# Patient Record
Sex: MALE | Race: WHITE | NOT HISPANIC OR LATINO | Employment: OTHER | URBAN - METROPOLITAN AREA
[De-identification: names, ages, dates, MRNs, and addresses within clinical notes are randomized per-mention and may not be internally consistent; named-entity substitution may affect disease eponyms.]

---

## 2022-11-12 ENCOUNTER — HOSPITAL ENCOUNTER (EMERGENCY)
Facility: HOSPITAL | Age: 82
Discharge: HOME | End: 2022-11-13
Attending: EMERGENCY MEDICINE
Payer: OTHER GOVERNMENT

## 2022-11-12 VITALS
BODY MASS INDEX: 24.34 KG/M2 | HEART RATE: 54 BPM | RESPIRATION RATE: 18 BRPM | SYSTOLIC BLOOD PRESSURE: 152 MMHG | OXYGEN SATURATION: 98 % | DIASTOLIC BLOOD PRESSURE: 72 MMHG | TEMPERATURE: 98 F | WEIGHT: 170 LBS | HEIGHT: 70 IN

## 2022-11-12 DIAGNOSIS — I10 HYPERTENSION, UNSPECIFIED TYPE: Primary | ICD-10-CM

## 2022-11-12 RX ORDER — HYDRALAZINE HYDROCHLORIDE 25 MG/1
25 TABLET, FILM COATED ORAL
Qty: 90 TABLET | Refills: 11 | Status: SHIPPED | OUTPATIENT
Start: 2022-11-12 | End: 2022-11-13 | Stop reason: SDUPTHER

## 2022-11-13 PROCEDURE — 99283 EMERGENCY DEPT VISIT LOW MDM: CPT

## 2022-11-13 RX ORDER — HYDRALAZINE HYDROCHLORIDE 25 MG/1
25 TABLET, FILM COATED ORAL
Qty: 21 TABLET | Refills: 0 | Status: SHIPPED | OUTPATIENT
Start: 2022-11-13 | End: 2023-11-13

## 2022-11-13 NOTE — ED NOTES
Pt reports elevated bp since 1800, highest of 202/88 pta. Denies cp, sob or change of neurological symptoms.     Aaox4. Resp e/u. VSS. nad

## 2022-11-13 NOTE — DISCHARGE INSTRUCTIONS
Continue your blood pressure medications as prescribed.  If after taking your medications your blood pressure is above 160 for the top number, or above 100 for the bottom number, take hydralazine.  You can take it up to 3 times daily.  Follow-[facility] as soon as possible with [facility] and return here as needed or if worse in any way.

## 2022-11-13 NOTE — ED PROVIDER NOTES
Encounter Date: 11/12/2022       History     Chief Complaint   Patient presents with    Hypertension     hypertension     81-year-old male here for evaluation of elevated blood pressure.  He states that at home, he took his blood pressure and found the systolic pressure to be slightly over 200, with the bottom number slightly above 100.  He states he took his evening blood pressure medication little bit early, and by the time he arrived here, his blood pressure is now about 150 systolic and 70 diastolic.  He denies any symptoms.  He does not know wise blood pressure became elevated.  He states it may be caused by fatigue.  He states he got up very early this morning and went fishing for several hours today.      Review of patient's allergies indicates:  No Known Allergies  Past Medical History:   Diagnosis Date    Coronary artery disease      Past Surgical History:   Procedure Laterality Date    CARDIAC SURGERY       History reviewed. No pertinent family history.  Social History     Tobacco Use    Smoking status: Former     Types: Cigarettes    Smokeless tobacco: Never   Substance Use Topics    Alcohol use: Not Currently    Drug use: Never     Review of Systems   Constitutional: Negative.    HENT: Negative.     Eyes: Negative.    Respiratory: Negative.     Cardiovascular: Negative.    Gastrointestinal: Negative.    Endocrine: Negative.    Genitourinary: Negative.    Musculoskeletal: Negative.    Skin: Negative.    Allergic/Immunologic: Negative.    Neurological: Negative.    Hematological: Negative.    Psychiatric/Behavioral: Negative.       Physical Exam     Initial Vitals [11/12/22 2149]   BP Pulse Resp Temp SpO2   (!) 167/88 60 18 98.2 °F (36.8 °C) 98 %      MAP       --         Physical Exam    Nursing note and vitals reviewed.  Constitutional: He appears well-developed and well-nourished. He is not diaphoretic. No distress.   HENT:   Head: Normocephalic and atraumatic.   Nose: Nose normal.   Mouth/Throat:  Oropharynx is clear and moist. No oropharyngeal exudate.   Eyes: Conjunctivae and EOM are normal. Pupils are equal, round, and reactive to light. No scleral icterus.   Neck: Neck supple. No JVD present.   Normal range of motion.  Cardiovascular:  Normal rate, regular rhythm, normal heart sounds and intact distal pulses.           No murmur heard.  Pulmonary/Chest: Breath sounds normal. No stridor. No respiratory distress.   Abdominal: Abdomen is soft. Bowel sounds are normal. He exhibits no distension. There is no abdominal tenderness.   Genitourinary:    Prostate and penis normal.     Musculoskeletal:         General: No tenderness or edema. Normal range of motion.      Cervical back: Normal range of motion and neck supple.     Neurological: He is alert and oriented to person, place, and time. He has normal strength and normal reflexes. No cranial nerve deficit or sensory deficit. GCS score is 15. GCS eye subscore is 4. GCS verbal subscore is 5. GCS motor subscore is 6.   Skin: Skin is warm and dry. Capillary refill takes less than 2 seconds. No rash noted. No erythema.   Psychiatric: He has a normal mood and affect. His behavior is normal.       ED Course   Procedures  Labs Reviewed - No data to display       Imaging Results    None          Medications - No data to display  Medical Decision Making:   Differential Diagnosis:   Hypertension, medication noncompliance, fatigue, dietary noncompliance, etc.  ED Management:  Evidently patient's blood pressures were elevated prior to coming here, but while here, they are acceptable.  He states he took his evening medications little bit early because of the elevation.  He denies any complaints whatsoever.  I believe he is safe for discharge.  I have prescribed hydralazine to be taken p.r.n. with parameters.  [facility], but will return here for any problems or concerns.                        Clinical Impression:   Final diagnoses:  [I10]  Hypertension, unspecified type (Primary)        ED Disposition Condition    Discharge Stable          ED Prescriptions       Medication Sig Dispense Start Date End Date Auth. Provider    hydrALAZINE (APRESOLINE) 25 MG tablet  (Status: Discontinued) Take 1 tablet (25 mg total) by mouth after meals as needed. For systolic blood pressure greater than 160 or diastolic blood pressure greater than 100. 90 tablet 11/12/2022 11/13/2022 Krishna Sahni MD    hydrALAZINE (APRESOLINE) 25 MG tablet Take 1 tablet (25 mg total) by mouth after meals as needed. For systolic blood pressure greater than 160 or diastolic blood pressure greater than 100. 21 tablet 11/13/2022 11/13/2023 Krishna Sahni MD          Follow-up Information       Follow up With Specialties Details Why Contact Info    [facility]  Schedule an appointment as soon as possible for a visit       [facility] Emergency Medicine  As needed, If symptoms worsen [address] [facility] [id number]  [phone]             Krishna Sahni MD  11/13/22 0005

## 2022-11-13 NOTE — ED NOTES
D/c to home. Rx provided with instructions for use. V/S stable. Normotensive. Follow up explained.

## 2024-03-16 ENCOUNTER — HOSPITAL ENCOUNTER (EMERGENCY)
Facility: HOSPITAL | Age: 84
Discharge: HOME | End: 2024-03-16
Payer: OTHER GOVERNMENT

## 2024-03-16 VITALS
SYSTOLIC BLOOD PRESSURE: 173 MMHG | HEART RATE: 79 BPM | RESPIRATION RATE: 20 BRPM | HEIGHT: 70 IN | TEMPERATURE: 98 F | OXYGEN SATURATION: 98 % | WEIGHT: 162 LBS | DIASTOLIC BLOOD PRESSURE: 89 MMHG | BODY MASS INDEX: 23.19 KG/M2

## 2024-03-16 DIAGNOSIS — S63.501A SPRAIN OF RIGHT WRIST, INITIAL ENCOUNTER: Primary | ICD-10-CM

## 2024-03-16 DIAGNOSIS — M25.531 RIGHT WRIST PAIN: ICD-10-CM

## 2024-03-16 PROCEDURE — 63600175 PHARM REV CODE 636 W HCPCS: Performed by: PHYSICIAN ASSISTANT

## 2024-03-16 PROCEDURE — 29125 APPL SHORT ARM SPLINT STATIC: CPT | Mod: RT

## 2024-03-16 PROCEDURE — 73110 X-RAY EXAM OF WRIST: CPT | Mod: 26,RT,, | Performed by: RADIOLOGY

## 2024-03-16 PROCEDURE — 73110 X-RAY EXAM OF WRIST: CPT | Mod: TC,RT

## 2024-03-16 PROCEDURE — 99284 EMERGENCY DEPT VISIT MOD MDM: CPT | Mod: 25

## 2024-03-16 PROCEDURE — 96372 THER/PROPH/DIAG INJ SC/IM: CPT | Performed by: PHYSICIAN ASSISTANT

## 2024-03-16 RX ORDER — ALBUTEROL SULFATE 90 UG/1
AEROSOL, METERED RESPIRATORY (INHALATION)
COMMUNITY
Start: 2023-05-10

## 2024-03-16 RX ORDER — ISOSORBIDE MONONITRATE 60 MG/1
60 TABLET, EXTENDED RELEASE ORAL
COMMUNITY
Start: 2023-09-14

## 2024-03-16 RX ORDER — ROSUVASTATIN CALCIUM 10 MG/1
1 TABLET, COATED ORAL DAILY
COMMUNITY
Start: 2023-12-11

## 2024-03-16 RX ORDER — FENOFIBRATE 145 MG/1
145 TABLET, FILM COATED ORAL
COMMUNITY
Start: 2023-09-14

## 2024-03-16 RX ORDER — FOLIC ACID 1 MG/1
1 TABLET ORAL
COMMUNITY
Start: 2023-10-30

## 2024-03-16 RX ORDER — LOSARTAN POTASSIUM 100 MG/1
50 TABLET ORAL
COMMUNITY
Start: 2024-02-14

## 2024-03-16 RX ORDER — CARVEDILOL 3.12 MG/1
TABLET ORAL
COMMUNITY
Start: 2024-02-14

## 2024-03-16 RX ORDER — LANOLIN ALCOHOL/MO/W.PET/CERES
1000 CREAM (GRAM) TOPICAL
COMMUNITY
Start: 2023-12-11

## 2024-03-16 RX ORDER — CLOPIDOGREL BISULFATE 75 MG/1
75 TABLET ORAL
COMMUNITY
Start: 2023-09-19

## 2024-03-16 RX ORDER — DEXAMETHASONE SODIUM PHOSPHATE 10 MG/ML
10 INJECTION INTRAMUSCULAR; INTRAVENOUS
Status: COMPLETED | OUTPATIENT
Start: 2024-03-16 | End: 2024-03-16

## 2024-03-16 RX ADMIN — DEXAMETHASONE SODIUM PHOSPHATE 10 MG: 10 INJECTION INTRAMUSCULAR; INTRAVENOUS at 07:03

## 2024-03-17 NOTE — DISCHARGE INSTRUCTIONS
Take Tylenol as needed for pain.  Wear the splint until evaluated by Orthopedic.  Return for any worsening or new symptoms. Follow up with Orthopedic in the next 5-7 days.

## 2024-03-17 NOTE — ED PROVIDER NOTES
"  Please note that my documentation in this Electronic Healthcare Record was produced using speech recognition software and therefore may contain errors related to that software.These could include grammar, punctuation and spelling errors or the inclusion/ exclusion of phrases that were not intended. Please contact myself for any clarification, questions or concerns.    HPI: Patient is a 83 y.o. male who presents with the chief complaint of right wrist pain X today.  Patient was at the [employer] pure[employer] when he caught a big head of cabbage.  Denies numbness or tingling.  He has taken some Tylenol.  Denies any actual fall.    REVIEW OF SYSTEMS - 10 systems were independently reviewed and are otherwise negative with the exception of those items previously documented in the HPI and nursing notes.    Allergy: Clindamycin, Erythromycin, Iodinated contrast media, and Niacin    Past medical history:   Past Medical History:   Diagnosis Date    Coronary artery disease        Surgical History:   Past Surgical History:   Procedure Laterality Date    CARDIAC SURGERY         Social history:   Social History     Socioeconomic History    Marital status:    Tobacco Use    Smoking status: Former     Types: Cigarettes    Smokeless tobacco: Never   Substance and Sexual Activity    Alcohol use: Not Currently    Drug use: Never    Sexual activity: Not Currently       Family history: non-contributory    EHR: reviewed    Vitals: BP (!) 173/89   Pulse 79   Temp 98 °F (36.7 °C) (Oral)   Resp 20   Ht 5' 10" (1.778 m)   Wt 73.5 kg (162 lb)   SpO2 98%   BMI 23.24 kg/m²     PHYSICAL EXAM:    General- 83-year-old male awake and alert, oriented, GCS 15, in no acute distress,  HEENT- normocephalic, atraumatic, sclera anicteric  CARDIOVASCULAR- regular rate and rhythm  PULMONARY- nonlabored, no respiratory distress  NEUROLOGIC- mental status normal, speech fluid, cognition normal, CN II-XII grossly intact, ambulatory with proper " gait.  MUSCULOSKELETAL- well-nourished, well-developed, neuro intact, tenderness over the right lateral wrist with tenderness over the scaphoid and the CMC joint.  Full range of motion of the right upper extremity.  DERMATOLOGIC- warm and dry, no visible rashes  PSYCHIATRIC- normal affect, normal concentration           Labs Reviewed - No data to display    X-Ray Wrist Complete Right   Final Result      As above.         Electronically signed by: Carmita Gaffney   Date:    03/16/2024   Time:    19:02          MEDICAL DECISION MAKING: Patient is a 83 y.o. male who presented with chief complaint of right wrist pain after catching cabbage today.  Denies fall or significant trauma.  Denies numbness or tingling.  On examination, he is neuro intact with good peripheral pulse, sensation, and cap refill.  Does have tenderness over the 1st CMC joints and over the right lateral wrist including the scaphoid.  Differentials considered, fracture dislocation, sprain/strain, contusion, etc..  X-ray of the wrist reviewed by myself radiology shows significant arthritic changes over the carpometacarpal joint.  Nothing acutely abnormal.  I have placed the patient in a thumb spica splint until he is evaluated [facility]. advised on Tylenol.  Patient's vitals are stable and normal.  They will be discharged home in stable condition.  They verbalized their understanding and agreed to this plan.    CLINICAL IMPRESSION:  1. Sprain of right wrist, initial encounter    2. Right wrist pain         Alexandra Levy PA  03/16/24 1928

## 2024-09-17 ENCOUNTER — HOSPITAL ENCOUNTER (EMERGENCY)
Facility: HOSPITAL | Age: 84
Discharge: HOME | End: 2024-09-17
Payer: OTHER GOVERNMENT

## 2024-09-17 VITALS
RESPIRATION RATE: 18 BRPM | WEIGHT: 162 LBS | SYSTOLIC BLOOD PRESSURE: 149 MMHG | HEART RATE: 71 BPM | DIASTOLIC BLOOD PRESSURE: 76 MMHG | TEMPERATURE: 98 F | HEIGHT: 70 IN | OXYGEN SATURATION: 98 % | BODY MASS INDEX: 23.19 KG/M2

## 2024-09-17 DIAGNOSIS — I10 HYPERTENSION: ICD-10-CM

## 2024-09-17 DIAGNOSIS — I10 UNCONTROLLED HYPERTENSION: ICD-10-CM

## 2024-09-17 DIAGNOSIS — R07.9 CHEST PAIN: ICD-10-CM

## 2024-09-17 LAB
ALBUMIN SERPL BCP-MCNC: 3.8 G/DL (ref 3.5–5.2)
ALP SERPL-CCNC: 38 U/L (ref 55–135)
ALT SERPL W/O P-5'-P-CCNC: 10 U/L (ref 10–44)
ANION GAP SERPL CALC-SCNC: 9 MMOL/L (ref 8–16)
AST SERPL-CCNC: 16 U/L (ref 10–40)
BASOPHILS # BLD AUTO: 0.08 K/UL (ref 0–0.2)
BASOPHILS NFR BLD: 1.7 % (ref 0–1.9)
BILIRUB SERPL-MCNC: 0.4 MG/DL (ref 0.1–1)
BNP SERPL-MCNC: 105 PG/ML (ref 0–99)
BUN SERPL-MCNC: 25 MG/DL (ref 8–23)
CALCIUM SERPL-MCNC: 9.4 MG/DL (ref 8.7–10.5)
CHLORIDE SERPL-SCNC: 108 MMOL/L (ref 95–110)
CO2 SERPL-SCNC: 24 MMOL/L (ref 23–29)
CREAT SERPL-MCNC: 1.6 MG/DL (ref 0.5–1.4)
DIFFERENTIAL METHOD BLD: ABNORMAL
EOSINOPHIL # BLD AUTO: 0.4 K/UL (ref 0–0.5)
EOSINOPHIL NFR BLD: 8.3 % (ref 0–8)
ERYTHROCYTE [DISTWIDTH] IN BLOOD BY AUTOMATED COUNT: 14 % (ref 11.5–14.5)
EST. GFR  (NO RACE VARIABLE): 43 ML/MIN/1.73 M^2
GLUCOSE SERPL-MCNC: 111 MG/DL (ref 70–110)
HCT VFR BLD AUTO: 40.3 % (ref 40–54)
HGB BLD-MCNC: 13.4 G/DL (ref 14–18)
IMM GRANULOCYTES # BLD AUTO: 0.01 K/UL (ref 0–0.04)
IMM GRANULOCYTES NFR BLD AUTO: 0.2 % (ref 0–0.5)
LYMPHOCYTES # BLD AUTO: 1.2 K/UL (ref 1–4.8)
LYMPHOCYTES NFR BLD: 24.4 % (ref 18–48)
MCH RBC QN AUTO: 30.2 PG (ref 27–31)
MCHC RBC AUTO-ENTMCNC: 33.3 G/DL (ref 32–36)
MCV RBC AUTO: 91 FL (ref 82–98)
MONOCYTES # BLD AUTO: 0.6 K/UL (ref 0.3–1)
MONOCYTES NFR BLD: 11.4 % (ref 4–15)
NEUTROPHILS # BLD AUTO: 2.6 K/UL (ref 1.8–7.7)
NEUTROPHILS NFR BLD: 54 % (ref 38–73)
NRBC BLD-RTO: 0 /100 WBC
PLATELET # BLD AUTO: 177 K/UL (ref 150–450)
PMV BLD AUTO: 11.6 FL (ref 9.2–12.9)
POTASSIUM SERPL-SCNC: 4.2 MMOL/L (ref 3.5–5.1)
PROT SERPL-MCNC: 6.6 G/DL (ref 6–8.4)
RBC # BLD AUTO: 4.43 M/UL (ref 4.6–6.2)
SODIUM SERPL-SCNC: 141 MMOL/L (ref 136–145)
TROPONIN I SERPL DL<=0.01 NG/ML-MCNC: 0.01 NG/ML (ref 0–0.03)
WBC # BLD AUTO: 4.83 K/UL (ref 3.9–12.7)

## 2024-09-17 PROCEDURE — 93010 ELECTROCARDIOGRAM REPORT: CPT | Mod: ,,, | Performed by: INTERNAL MEDICINE

## 2024-09-17 PROCEDURE — 85025 COMPLETE CBC W/AUTO DIFF WBC: CPT | Performed by: NURSE PRACTITIONER

## 2024-09-17 PROCEDURE — 71045 X-RAY EXAM CHEST 1 VIEW: CPT | Mod: 26,,, | Performed by: RADIOLOGY

## 2024-09-17 PROCEDURE — 93005 ELECTROCARDIOGRAM TRACING: CPT

## 2024-09-17 PROCEDURE — 86803 HEPATITIS C AB TEST: CPT | Performed by: EMERGENCY MEDICINE

## 2024-09-17 PROCEDURE — 96374 THER/PROPH/DIAG INJ IV PUSH: CPT

## 2024-09-17 PROCEDURE — 84484 ASSAY OF TROPONIN QUANT: CPT | Performed by: NURSE PRACTITIONER

## 2024-09-17 PROCEDURE — 87389 HIV-1 AG W/HIV-1&-2 AB AG IA: CPT | Performed by: EMERGENCY MEDICINE

## 2024-09-17 PROCEDURE — 99285 EMERGENCY DEPT VISIT HI MDM: CPT | Mod: 25

## 2024-09-17 PROCEDURE — 71045 X-RAY EXAM CHEST 1 VIEW: CPT | Mod: TC

## 2024-09-17 PROCEDURE — 83880 ASSAY OF NATRIURETIC PEPTIDE: CPT | Performed by: NURSE PRACTITIONER

## 2024-09-17 PROCEDURE — 94760 N-INVAS EAR/PLS OXIMETRY 1: CPT

## 2024-09-17 PROCEDURE — 63600175 PHARM REV CODE 636 W HCPCS: Performed by: NURSE PRACTITIONER

## 2024-09-17 PROCEDURE — 80053 COMPREHEN METABOLIC PANEL: CPT | Performed by: NURSE PRACTITIONER

## 2024-09-17 RX ORDER — HYDRALAZINE HYDROCHLORIDE 20 MG/ML
10 INJECTION INTRAMUSCULAR; INTRAVENOUS
Status: COMPLETED | OUTPATIENT
Start: 2024-09-17 | End: 2024-09-17

## 2024-09-17 RX ORDER — CARVEDILOL 6.25 MG/1
6.25 TABLET ORAL 2 TIMES DAILY
Qty: 60 TABLET | Refills: 0 | Status: SHIPPED | OUTPATIENT
Start: 2024-09-17 | End: 2024-10-17

## 2024-09-17 RX ADMIN — HYDRALAZINE HYDROCHLORIDE 10 MG: 20 INJECTION INTRAMUSCULAR; INTRAVENOUS at 07:09

## 2024-09-18 LAB
HCV AB SERPL QL IA: NORMAL
HIV 1+2 AB+HIV1 P24 AG SERPL QL IA: NORMAL
OHS QRS DURATION: 94 MS
OHS QTC CALCULATION: 412 MS

## 2024-09-18 NOTE — ED PROVIDER NOTES
Encounter Date: 9/17/2024       History     Chief Complaint   Patient presents with    Hypertension     Pt reports bp readings have been high the last couple days. Pt reports he has been taking his prescribed htn meds. Pt reports no cp, sob, or headache.      83-year-old male who comes in with complaints of elevated blood pressure for the last 2 days.  He denies any recent change in his blood pressure medications.  He reports blood pressure medications he takes losartan 100 mg daily and Coreg 3125 mg twice.  He is also on isosorbide daily.  He denies chest pain or headache.        Review of patient's allergies indicates:   Allergen Reactions    Clindamycin     Erythromycin     Iodinated contrast media     Niacin Rash     Past Medical History:   Diagnosis Date    Coronary artery disease      Past Surgical History:   Procedure Laterality Date    CARDIAC SURGERY       No family history on file.  Social History     Tobacco Use    Smoking status: Every Day     Types: Cigarettes    Smokeless tobacco: Never   Substance Use Topics    Alcohol use: Not Currently    Drug use: Never     Review of Systems   Constitutional:  Negative for fever.   HENT:  Negative for sore throat.    Respiratory:  Negative for shortness of breath.    Cardiovascular:  Negative for chest pain.   Gastrointestinal:  Negative for nausea.   Genitourinary:  Negative for dysuria.   Musculoskeletal:  Negative for back pain.   Skin:  Negative for rash.   Neurological:  Negative for weakness.   Hematological:  Does not bruise/bleed easily.       Physical Exam     Initial Vitals [09/17/24 1844]   BP Pulse Resp Temp SpO2   (!) 203/93 71 18 97.6 °F (36.4 °C) 98 %      MAP       --         Physical Exam    Nursing note and vitals reviewed.  Constitutional: He appears well-developed and well-nourished.   HENT:   Head: Normocephalic and atraumatic.   Eyes: Conjunctivae and EOM are normal.   Neck: Neck supple.   Normal range of motion.  Cardiovascular:  Normal rate  and regular rhythm.           Pulmonary/Chest: Breath sounds normal. He has no wheezes.   Abdominal: Abdomen is soft. Bowel sounds are normal. There is no rebound and no guarding.   Musculoskeletal:         General: No tenderness. Normal range of motion.      Cervical back: Normal range of motion and neck supple.     Neurological: He is alert and oriented to person, place, and time.   Skin: Skin is warm and dry.   Psychiatric: He has a normal mood and affect.         ED Course   Procedures  Labs Reviewed   CBC W/ AUTO DIFFERENTIAL - Abnormal       Result Value    WBC 4.83      RBC 4.43 (*)     Hemoglobin 13.4 (*)     Hematocrit 40.3      MCV 91      MCH 30.2      MCHC 33.3      RDW 14.0      Platelets 177      MPV 11.6      Immature Granulocytes 0.2      Gran # (ANC) 2.6      Immature Grans (Abs) 0.01      Lymph # 1.2      Mono # 0.6      Eos # 0.4      Baso # 0.08      nRBC 0      Gran % 54.0      Lymph % 24.4      Mono % 11.4      Eosinophil % 8.3 (*)     Basophil % 1.7      Differential Method Automated      Narrative:     Release to patient->Immediate   COMPREHENSIVE METABOLIC PANEL - Abnormal    Sodium 141      Potassium 4.2      Chloride 108      CO2 24      Glucose 111 (*)     BUN 25 (*)     Creatinine 1.6 (*)     Calcium 9.4      Total Protein 6.6      Albumin 3.8      Total Bilirubin 0.4      Alkaline Phosphatase 38 (*)     AST 16      ALT 10      eGFR 43.0 (*)     Anion Gap 9      Narrative:     Release to patient->Immediate   B-TYPE NATRIURETIC PEPTIDE - Abnormal     (*)     Narrative:     Release to patient->Immediate   TROPONIN I    Troponin I 0.010      Narrative:     Release to patient->Immediate   HIV 1 / 2 ANTIBODY   HEPATITIS C ANTIBODY     EKG Readings: (Independently Interpreted)   Initial Reading: No STEMI. Heart Rate: 70. Ectopy: No Ectopy. Conduction: 1st Degree AV Block and LPFB. ST Segments: Normal ST Segments. T Waves: Normal.   18:53 shows sinus rhythm HR 70 with 1st degree AV block        Imaging Results              X-Ray Chest AP Portable (Final result)  Result time 09/17/24 19:52:29      Final result by Moncho Madison MD (09/17/24 19:52:29)                   Impression:      No convincing radiographic evidence of acute intrathoracic process on this single view.      Electronically signed by: Moncho Madison MD  Date:    09/17/2024  Time:    19:52               Narrative:    EXAMINATION:  XR CHEST AP PORTABLE    CLINICAL HISTORY:  Essential (primary) hypertension    TECHNIQUE:  Single frontal view of the chest was performed.    COMPARISON:  08/10/2010    FINDINGS:  There is postoperative change of prior median sternotomy.  The cardiac silhouette is not significantly enlarged.  There is aortic atherosclerosis.  Lungs are symmetrically expanded without evidence of confluent airspace consolidation.  Presumed calcified granuloma projects over the right lower lung zone.  No significant volume of pleural fluid or pneumothorax identified.  Osseous structures demonstrate degenerative changes.                                       Medications   hydrALAZINE injection 10 mg (10 mg Intravenous Given 9/17/24 1915)     Medical Decision Making  83-year-old male who comes in with complaints of elevated blood pressure for the last 2 days.  He denies any recent change in his blood pressure medications.  He reports blood pressure medications he takes losartan 100 mg daily and Coreg 3125 mg twice.  He is also on isosorbide daily.  He denies chest pain or headache.    Patient was asymptomatic with elevated blood pressure.  He has had a negative workup here.  Workup included CBC, CMP, troponin, BNP, chest and EKG.  Given 10 mg of hydralazine here and blood pressure improved.  We will increase his Coreg to 6.25 mg twice daily.  Instructed to keep a log of his blood pressures in the follow up with his PCP      Amount and/or Complexity of Data Reviewed  Labs: ordered.  Radiology: ordered.    Risk  Prescription  drug management.      Additional MDM:   Differential Diagnosis:   Uncontrolled HTN, hypertensive emergency, hypertensive urgency                                    Clinical Impression:  Final diagnoses:  [I10] Hypertension  [R07.9] Chest pain  [I10] Uncontrolled hypertension          ED Disposition Condition    Discharge Stable          ED Prescriptions       Medication Sig Dispense Start Date End Date Auth. Provider    carvediloL (COREG) 6.25 MG tablet Take 1 tablet (6.25 mg total) by mouth 2 (two) times daily. 60 tablet 9/17/2024 10/17/2024 Chloé López NP          Follow-up Information       Follow up With Specialties Details Why Contact Info    [place] [facility] -  Call   [address]  [place] [address] [phone]               Chloé López NP  09/17/24 2008